# Patient Record
Sex: FEMALE | Race: WHITE | NOT HISPANIC OR LATINO | URBAN - METROPOLITAN AREA
[De-identification: names, ages, dates, MRNs, and addresses within clinical notes are randomized per-mention and may not be internally consistent; named-entity substitution may affect disease eponyms.]

---

## 2021-04-28 ENCOUNTER — NEW CATARACT EVAL (OUTPATIENT)
Dept: URBAN - METROPOLITAN AREA CLINIC 53 | Facility: CLINIC | Age: 76
End: 2021-04-28

## 2021-04-28 DIAGNOSIS — H57.813: ICD-10-CM

## 2021-04-28 DIAGNOSIS — H02.834: ICD-10-CM

## 2021-04-28 DIAGNOSIS — H02.831: ICD-10-CM

## 2021-04-28 DIAGNOSIS — H35.373: ICD-10-CM

## 2021-04-28 DIAGNOSIS — H25.813: ICD-10-CM

## 2021-04-28 DIAGNOSIS — H16.223: ICD-10-CM

## 2021-04-28 PROCEDURE — 92134 CPTRZ OPH DX IMG PST SGM RTA: CPT

## 2021-04-28 PROCEDURE — 92004 COMPRE OPH EXAM NEW PT 1/>: CPT

## 2021-04-28 ASSESSMENT — VISUAL ACUITY
OS_PH: 20/30
OD_PH: 20/60
OS_GLARE: >20/400
OS_SC: J3
OD_SC: J10
OS_GLARE: 20/150
OD_GLARE: >20/400
OD_GLARE: 20/200
OU_SC: J3
OS_CC: 20/40
OD_CC: 20/80

## 2021-04-28 ASSESSMENT — KERATOMETRY
OS_K2POWER_DIOPTERS: 43.25
OS_K1POWER_DIOPTERS: 45.50
OS_AXISANGLE_DEGREES: 081
OD_K2POWER_DIOPTERS: 42.75
OD_K1POWER_DIOPTERS: 44.50
OS_AXISANGLE2_DEGREES: 171
OD_AXISANGLE2_DEGREES: 6
OD_AXISANGLE_DEGREES: 096

## 2021-04-28 ASSESSMENT — TONOMETRY
OS_IOP_MMHG: 18
OD_IOP_MMHG: 18

## 2021-05-14 ENCOUNTER — BIOMETRY (OUTPATIENT)
Dept: URBAN - METROPOLITAN AREA CLINIC 53 | Facility: CLINIC | Age: 76
End: 2021-05-14

## 2021-05-14 DIAGNOSIS — H25.813: ICD-10-CM

## 2021-05-14 PROCEDURE — 92136 OPHTHALMIC BIOMETRY: CPT

## 2021-05-14 PROCEDURE — TOPOIOL CORNEAL TOPOGRAPHY-PREMIUM IOL

## 2021-05-14 ASSESSMENT — KERATOMETRY
OS_AXISANGLE_DEGREES: 079
OS_K2POWER_DIOPTERS: 43.12
OS_AXISANGLE2_DEGREES: 169
OD_AXISANGLE2_DEGREES: 9
OS_K1POWER_DIOPTERS: 46.12
OD_AXISANGLE_DEGREES: 099
OD_K2POWER_DIOPTERS: 42.75
OD_K1POWER_DIOPTERS: 44.62

## 2021-05-14 NOTE — PATIENT DISCUSSION
Hx CLs OU per patient has [place]. Patient unsure if she would like to continue with [facility] with surgery, unsure which eye is distance and which is near.

## 2021-05-26 ENCOUNTER — PRE-OP - (OUTPATIENT)
Dept: URBAN - METROPOLITAN AREA CLINIC 53 | Facility: CLINIC | Age: 76
End: 2021-05-26

## 2021-05-26 DIAGNOSIS — H25.811: ICD-10-CM

## 2021-05-26 PROCEDURE — PREOP PRE OP VISIT

## 2021-05-26 ASSESSMENT — TONOMETRY
OD_IOP_MMHG: 17
OS_IOP_MMHG: 19

## 2021-05-26 ASSESSMENT — KERATOMETRY
OD_K2POWER_DIOPTERS: 42.75
OD_K1POWER_DIOPTERS: 44.62
OS_AXISANGLE2_DEGREES: 169
OS_K2POWER_DIOPTERS: 43.12
OS_K1POWER_DIOPTERS: 46.12
OS_AXISANGLE_DEGREES: 079
OD_AXISANGLE2_DEGREES: 9
OD_AXISANGLE_DEGREES: 099

## 2021-05-26 ASSESSMENT — VISUAL ACUITY
OD_SC: 20/300
OS_SC: 20/80

## 2021-05-26 NOTE — PATIENT DISCUSSION
Discussion on patient being a good candidate for MF toric lens. Discussed possible glare and halos with MF lens especially with high order astigmatism. Patient would like to do [facility] @ [facility]U.

## 2021-05-26 NOTE — PATIENT DISCUSSION
CATARACT SURGERY PLANNER - TORIC IOL/+FEMTO/ORA: Phacoemulsification with IOL: Eye: OD|DOS: 6/3/21|Model: [id number]|Power: 14.5 VS 15.0 (ORA)|Target: PLANO|Femto: YES|Axis: 95|Visc: DUET|Omidria: YES|10% Phenylephrine: YES|Epi-shugarcaine: YES|Phaco Setting: STD|BSS+: NO|Trypan Blue: NO|CTR: NO|Olive Tip: NO|Atropine: NO|Pupilloplasty: NO|Notes: PLAN: Rannie Bitter @ [facility]U; HX FAINT ERM OU, TR GUTTATA OU, MONO SCL- UNSURE WHICH IS NEAR AND DISTANCE. DILATED PUPIL: 6-7MM.

## 2021-05-26 NOTE — PATIENT DISCUSSION
Hx CLs OU per patient has [place]. Patient unsure if she would like to continue with [facility] with surgery, unsure which eye is distance and which is near.

## 2021-06-03 ENCOUNTER — SURGERY/PROCEDURE (OUTPATIENT)
Dept: URBAN - METROPOLITAN AREA SURGERY 16 | Facility: SURGERY | Age: 76
End: 2021-06-03

## 2021-06-03 ENCOUNTER — SAME DAY PO (OUTPATIENT)
Dept: URBAN - METROPOLITAN AREA CLINIC 48 | Facility: CLINIC | Age: 76
End: 2021-06-03

## 2021-06-03 DIAGNOSIS — Z96.1: ICD-10-CM

## 2021-06-03 DIAGNOSIS — H25.811: ICD-10-CM

## 2021-06-03 DIAGNOSIS — Z98.41: ICD-10-CM

## 2021-06-03 PROCEDURE — 99024 POSTOP FOLLOW-UP VISIT: CPT

## 2021-06-03 PROCEDURE — 66984 XCAPSL CTRC RMVL W/O ECP: CPT

## 2021-06-03 PROCEDURE — DIUXXFEMTO EYHANCE TORIC IOL WITH FEMTO

## 2021-06-03 ASSESSMENT — KERATOMETRY
OD_K1POWER_DIOPTERS: 44.62
OD_K1POWER_DIOPTERS: 44.62
OS_AXISANGLE_DEGREES: 079
OD_K2POWER_DIOPTERS: 42.75
OS_AXISANGLE2_DEGREES: 169
OS_K1POWER_DIOPTERS: 46.12
OS_AXISANGLE_DEGREES: 079
OD_AXISANGLE2_DEGREES: 9
OD_K2POWER_DIOPTERS: 42.75
OS_K2POWER_DIOPTERS: 43.12
OS_AXISANGLE2_DEGREES: 169
OS_K2POWER_DIOPTERS: 43.12
OD_AXISANGLE2_DEGREES: 9
OD_AXISANGLE_DEGREES: 099
OS_K1POWER_DIOPTERS: 46.12
OD_AXISANGLE_DEGREES: 099

## 2021-06-03 ASSESSMENT — TONOMETRY: OD_IOP_MMHG: 21

## 2021-06-03 ASSESSMENT — VISUAL ACUITY: OD_CC: 20/30-2

## 2021-06-03 NOTE — PATIENT DISCUSSION
CATARACT SURGERY PLANNER - TORIC IOL/+FEMTO/ORA: Phacoemulsification with IOL: Eye: OD|DOS: 6/3/21|Model: [id number]|Power: 14.5 VS 15.0 (ORA)|Target: PLANO|Femto: YES|Axis: 95|Visc: DUET|Omidria: YES|10% Phenylephrine: YES|Epi-shugarcaine: YES|Phaco Setting: STD|BSS+: NO|Trypan Blue: NO|CTR: NO|Olive Tip: NO|Atropine: NO|Pupilloplasty: NO|Notes: PLAN: Janie Stinson @ [facility]; HX FAINT ERM OU, TR GUTTATA OU, MONO SCL- UNSURE WHICH IS NEAR AND DISTANCE. DILATED PUPIL: 6-7MM.

## 2021-06-07 ENCOUNTER — PRE-OP - (OUTPATIENT)
Dept: URBAN - METROPOLITAN AREA CLINIC 53 | Facility: CLINIC | Age: 76
End: 2021-06-07

## 2021-06-07 DIAGNOSIS — H25.812: ICD-10-CM

## 2021-06-07 PROCEDURE — PREOP PRE OP VISIT

## 2021-06-07 PROCEDURE — 92136 - 2N OPHTHALMIC BIOMETRY BY PARTIAL COHERENCE INTERFEROMETRY WITH INTRAOCULAR LENS POWER CALCULATION

## 2021-06-07 ASSESSMENT — KERATOMETRY
OS_AXISANGLE_DEGREES: 079
OD_K1POWER_DIOPTERS: 44.62
OS_K2POWER_DIOPTERS: 43.12
OS_K1POWER_DIOPTERS: 46.12
OD_AXISANGLE2_DEGREES: 9
OD_AXISANGLE_DEGREES: 099
OD_K2POWER_DIOPTERS: 42.75
OS_AXISANGLE2_DEGREES: 169

## 2021-06-07 ASSESSMENT — VISUAL ACUITY
OS_SC: 20/80
OD_SC: 20/20-1
OS_PH: 20/50

## 2021-06-07 ASSESSMENT — TONOMETRY
OS_IOP_MMHG: 18
OD_IOP_MMHG: 18

## 2021-06-07 NOTE — PATIENT DISCUSSION
CATARACT SURGERY PLANNER - TORIC IOL/+FEMTO: Phacoemulsification with IOL: Eye: OS|DOS: 6/17/21|Model: [id number]|Power: 14. 0|Target: PLANO|Femto: YES|Axis: 80|Visc: DUET|Omidria: YES|10% Phenylephrine: YES|Epi-shugarcaine: YES|Phaco Setting: STD|BSS+: Sergio Amble: NO|CTR: NO|Olive Tip: NO|Atropine: NO|Pupilloplasty: NO|Notes: PLAN: Pepito Back @ [facility]; HX FAINT ERM OU, TR GUTTATA OU, H/O MONO VA- UNSURE WHICH IS DIST AND NEAR. DILATED PUPIL: 6-7MM.

## 2021-06-07 NOTE — PATIENT DISCUSSION
1 week PO: Patient is doing well post-operatively. The importance of post-op drop compliance was emphasized. Drop scheduled reviewed with patient. Patient to call if any visual changes or concerns. No, Declined

## 2021-06-17 ENCOUNTER — SURGERY/PROCEDURE (OUTPATIENT)
Dept: URBAN - METROPOLITAN AREA SURGERY 16 | Facility: SURGERY | Age: 76
End: 2021-06-17

## 2021-06-17 DIAGNOSIS — H25.812: ICD-10-CM

## 2021-06-17 PROBLEM — Z98.42: Noted: 2021-06-17

## 2021-06-17 PROCEDURE — DIUXXFEMTO EYHANCE TORIC IOL WITH FEMTO

## 2021-06-17 PROCEDURE — 66984 XCAPSL CTRC RMVL W/O ECP: CPT

## 2021-06-17 ASSESSMENT — KERATOMETRY
OD_AXISANGLE2_DEGREES: 9
OD_K2POWER_DIOPTERS: 42.75
OS_K1POWER_DIOPTERS: 46.12
OD_AXISANGLE_DEGREES: 099
OD_K1POWER_DIOPTERS: 44.62
OS_AXISANGLE2_DEGREES: 169
OS_AXISANGLE_DEGREES: 079
OS_K2POWER_DIOPTERS: 43.12

## 2021-06-17 NOTE — PATIENT DISCUSSION
CATARACT SURGERY PLANNER - TORIC IOL/+FEMTO: Phacoemulsification with IOL: Eye: OS|DOS: 6/17/21|Model: [id number]|Power: 14. 0|Target: PLANO|Femto: YES|Axis: 80|Visc: DUET|Omidria: YES|10% Phenylephrine: YES|Epi-shugarcaine: YES|Phaco Setting: STD|BSS+: Sergio Amble: NO|CTR: NO|Olive Tip: NO|Atropine: NO|Pupilloplasty: NO|Notes: PLAN: Pepito Back @ [facility]; HX FAINT ERM OU, TR GUTTATA OU, H/O MONO VA- UNSURE WHICH IS DIST AND NEAR. DILATED PUPIL: 6-7MM.

## 2021-06-18 ENCOUNTER — 1 DAY POST-OP (OUTPATIENT)
Dept: URBAN - METROPOLITAN AREA CLINIC 49 | Facility: CLINIC | Age: 76
End: 2021-06-18

## 2021-06-18 DIAGNOSIS — Z98.42: ICD-10-CM

## 2021-06-18 DIAGNOSIS — Z96.1: ICD-10-CM

## 2021-06-18 PROCEDURE — 99024 POSTOP FOLLOW-UP VISIT: CPT

## 2021-06-18 ASSESSMENT — TONOMETRY: OS_IOP_MMHG: 16

## 2021-06-18 ASSESSMENT — VISUAL ACUITY: OS_SC: 20/20-1

## 2021-06-18 ASSESSMENT — KERATOMETRY
OD_K2POWER_DIOPTERS: 42.75
OS_K2POWER_DIOPTERS: 43.12
OD_AXISANGLE_DEGREES: 099
OS_AXISANGLE_DEGREES: 079
OD_K1POWER_DIOPTERS: 44.62
OS_K1POWER_DIOPTERS: 46.12
OD_AXISANGLE2_DEGREES: 9
OS_AXISANGLE2_DEGREES: 169

## 2021-06-18 NOTE — PATIENT DISCUSSION
CATARACT SURGERY PLANNER - TORIC IOL/+FEMTO: Phacoemulsification with IOL: Eye: OS|DOS: 6/17/21|Model: [id number]|Power: 14. 0|Target: PLANO|Femto: YES|Axis: 80|Visc: DUET|Omidria: YES|10% Phenylephrine: YES|Epi-shugarcaine: YES|Phaco Setting: STD|BSS+: Odean Ghanshyam: NO|CTR: NO|Olive Tip: NO|Atropine: NO|Pupilloplasty: NO|Notes: PLAN: Val Lacy @ [facility]; HX FAINT ERM OU, TR GUTTATA OU, H/O MONO VA- UNSURE WHICH IS DIST AND NEAR. DILATED PUPIL: 6-7MM.

## 2021-06-23 ENCOUNTER — 1 WEEK POST-OP (OUTPATIENT)
Dept: URBAN - METROPOLITAN AREA CLINIC 53 | Facility: CLINIC | Age: 76
End: 2021-06-23

## 2021-06-23 DIAGNOSIS — Z98.42: ICD-10-CM

## 2021-06-23 PROCEDURE — 99024 POSTOP FOLLOW-UP VISIT: CPT

## 2021-06-23 ASSESSMENT — VISUAL ACUITY
OD_SC: J5
OS_SC: J5
OS_SC: J5
OD_SC: 20/20
OD_SC: J5-
OS_SC: 20/20

## 2021-06-23 ASSESSMENT — TONOMETRY
OS_IOP_MMHG: 16
OD_IOP_MMHG: 15

## 2021-07-28 ENCOUNTER — 4 WEEK POST-OP (OUTPATIENT)
Dept: URBAN - METROPOLITAN AREA CLINIC 53 | Facility: CLINIC | Age: 76
End: 2021-07-28

## 2021-07-28 DIAGNOSIS — Z98.41: ICD-10-CM

## 2021-07-28 DIAGNOSIS — Z98.42: ICD-10-CM

## 2021-07-28 PROCEDURE — 99024 POSTOP FOLLOW-UP VISIT: CPT

## 2021-07-28 PROCEDURE — 92015 DETERMINE REFRACTIVE STATE: CPT

## 2021-07-28 ASSESSMENT — VISUAL ACUITY
OS_SC: 20/20
OD_SC: J7
OD_SC: 20/20
OS_SC: J7

## 2021-07-28 ASSESSMENT — TONOMETRY
OS_IOP_MMHG: 14
OD_IOP_MMHG: 13

## 2021-07-28 NOTE — PATIENT DISCUSSION
Bothersome to patient. Patient okay to schedule Ptosis VF/Photos at her convenience then consult with Dr. Lisa Castillo.

## 2021-08-26 ENCOUNTER — DIAGNOSTICS - PTOSIS VF/PHOTOS (OUTPATIENT)
Dept: URBAN - METROPOLITAN AREA CLINIC 53 | Facility: CLINIC | Age: 76
End: 2021-08-26

## 2021-08-26 DIAGNOSIS — H02.831: ICD-10-CM

## 2021-08-26 DIAGNOSIS — H02.834: ICD-10-CM

## 2021-08-26 PROCEDURE — 92285 EXTERNAL OCULAR PHOTOGRAPHY: CPT

## 2021-08-26 PROCEDURE — 92082 INTERMEDIATE VISUAL FIELD XM: CPT

## 2021-12-13 ENCOUNTER — FOLLOW UP (OUTPATIENT)
Dept: URBAN - METROPOLITAN AREA CLINIC 53 | Facility: CLINIC | Age: 76
End: 2021-12-13

## 2021-12-13 DIAGNOSIS — H26.493: ICD-10-CM

## 2021-12-13 DIAGNOSIS — H35.373: ICD-10-CM

## 2021-12-13 DIAGNOSIS — H35.362: ICD-10-CM

## 2021-12-13 DIAGNOSIS — H43.813: ICD-10-CM

## 2021-12-13 PROCEDURE — 92134 CPTRZ OPH DX IMG PST SGM RTA: CPT

## 2021-12-13 PROCEDURE — 92014 COMPRE OPH EXAM EST PT 1/>: CPT

## 2021-12-13 ASSESSMENT — VISUAL ACUITY
OD_GLARE: 20/25
OD_GLARE: 20/25
OD_SC: 20/20-2
OS_GLARE: 20/30
OS_SC: 20/20
OU_SC: 20/20
OS_GLARE: 20/25

## 2021-12-13 ASSESSMENT — TONOMETRY
OS_IOP_MMHG: 13
OD_IOP_MMHG: 13

## 2021-12-13 NOTE — PATIENT DISCUSSION
Patient did not qualify at last exam, schedule [facility] in February 2022 at pt's convenience to see if patient will qualify.

## 2023-09-06 ENCOUNTER — COMPREHENSIVE EXAM (OUTPATIENT)
Dept: URBAN - METROPOLITAN AREA CLINIC 53 | Facility: CLINIC | Age: 78
End: 2023-09-06

## 2023-09-06 DIAGNOSIS — H35.362: ICD-10-CM

## 2023-09-06 DIAGNOSIS — H26.493: ICD-10-CM

## 2023-09-06 DIAGNOSIS — H35.373: ICD-10-CM

## 2023-09-06 DIAGNOSIS — H43.813: ICD-10-CM

## 2023-09-06 DIAGNOSIS — H04.123: ICD-10-CM

## 2023-09-06 PROCEDURE — 92014 COMPRE OPH EXAM EST PT 1/>: CPT

## 2023-09-06 ASSESSMENT — VISUAL ACUITY
OS_CC: J3
OD_CC: J2
OD_GLARE: 20/40
OD_SC: 20/25-1
OS_GLARE: 20/30
OS_SC: 20/25+2

## 2023-09-06 ASSESSMENT — TONOMETRY
OS_IOP_MMHG: 17
OD_IOP_MMHG: 18
OS_IOP_MMHG: 16
OD_IOP_MMHG: 14

## 2024-09-09 ENCOUNTER — COMPREHENSIVE EXAM (OUTPATIENT)
Dept: URBAN - METROPOLITAN AREA CLINIC 53 | Facility: CLINIC | Age: 79
End: 2024-09-09

## 2024-09-09 DIAGNOSIS — H35.362: ICD-10-CM

## 2024-09-09 DIAGNOSIS — H02.206: ICD-10-CM

## 2024-09-09 DIAGNOSIS — H02.831: ICD-10-CM

## 2024-09-09 DIAGNOSIS — H02.834: ICD-10-CM

## 2024-09-09 DIAGNOSIS — H16.223: ICD-10-CM

## 2024-09-09 DIAGNOSIS — H43.813: ICD-10-CM

## 2024-09-09 DIAGNOSIS — H35.373: ICD-10-CM

## 2024-09-09 DIAGNOSIS — H57.813: ICD-10-CM

## 2024-09-09 DIAGNOSIS — H02.203: ICD-10-CM

## 2024-09-09 DIAGNOSIS — H26.493: ICD-10-CM

## 2024-09-09 PROCEDURE — 99214 OFFICE O/P EST MOD 30 MIN: CPT

## 2024-09-09 PROCEDURE — 92134 CPTRZ OPH DX IMG PST SGM RTA: CPT
